# Patient Record
Sex: FEMALE | Race: WHITE | NOT HISPANIC OR LATINO | Employment: PART TIME | URBAN - METROPOLITAN AREA
[De-identification: names, ages, dates, MRNs, and addresses within clinical notes are randomized per-mention and may not be internally consistent; named-entity substitution may affect disease eponyms.]

---

## 2023-10-04 ENCOUNTER — OFFICE VISIT (OUTPATIENT)
Dept: URGENT CARE | Facility: URGENT CARE | Age: 23
End: 2023-10-04
Payer: COMMERCIAL

## 2023-10-04 ENCOUNTER — ANCILLARY PROCEDURE (OUTPATIENT)
Dept: GENERAL RADIOLOGY | Facility: CLINIC | Age: 23
End: 2023-10-04
Attending: NURSE PRACTITIONER
Payer: COMMERCIAL

## 2023-10-04 VITALS
RESPIRATION RATE: 16 BRPM | SYSTOLIC BLOOD PRESSURE: 108 MMHG | HEIGHT: 62 IN | BODY MASS INDEX: 42.33 KG/M2 | WEIGHT: 230 LBS | OXYGEN SATURATION: 97 % | HEART RATE: 88 BPM | TEMPERATURE: 98.4 F | DIASTOLIC BLOOD PRESSURE: 80 MMHG

## 2023-10-04 DIAGNOSIS — M79.671 RIGHT FOOT PAIN: ICD-10-CM

## 2023-10-04 DIAGNOSIS — S92.354A CLOSED NONDISPLACED FRACTURE OF FIFTH METATARSAL BONE OF RIGHT FOOT, INITIAL ENCOUNTER: Primary | ICD-10-CM

## 2023-10-04 PROCEDURE — 99204 OFFICE O/P NEW MOD 45 MIN: CPT | Performed by: NURSE PRACTITIONER

## 2023-10-04 PROCEDURE — 73630 X-RAY EXAM OF FOOT: CPT | Mod: TC | Performed by: RADIOLOGY

## 2023-10-04 RX ORDER — FLUOXETINE 20 MG/1
30 TABLET, FILM COATED ORAL DAILY
COMMUNITY

## 2023-10-04 NOTE — PROGRESS NOTES
"  ICD-10-CM    1. Closed nondisplaced fracture of fifth metatarsal bone of right foot, initial encounter  S92.354A Ankle/Foot Bracing [employer]lies DME Walking Boot; Right; Pneumatic; Short     Orthopedic  Referral      2. Right foot pain  M79.671 XR Foot Right G/E 3 Views      Patient placed in a walking boot. Ice, elevation, Ibuprofen or Tylenol as needed for pain. Follow up with orthopedics next week.    SUBJECTIVE:  Chief Complaint   Patient presents with    Urgent Care    Musculoskeletal Problem     Injury to right ankle. Woke up with foot asleep so she tripped on Friday     Lavelle Frias is a 22 year old female presents with a chief complaint of right foot and toe(s) fourth and fifth pain, swelling, and tenderness.  The injury occurred 4 day(s) ago.   The injury happened while while walking. How: near-fallimmediate pain, immediate swelling, inability to bear weight directly after injury.  The patient complained of moderate pain  and has had decreased ROM.  Pain exacerbated by walking, weight-bearing, and movement.  Relieved by rest and ice.  She treated it initially with ice and Ibuprofen. This is the first time this type of injury has occurred to this patient.     ROS:  Denies any numbness or tingling in the area.     EXAM:   /80   Pulse 88   Temp 98.4  F (36.9  C) (Temporal)   Resp 16   Ht 1.575 m (5' 2\")   Wt 104.3 kg (230 lb)   SpO2 97%   BMI 42.07 kg/m    Gen: alert, no distress, and healthy,alert,no distress  Extremity: foot and toe(s) fourth and fifth has swelling and point tenderness along the fifth metatarsal.   There is not compromise to the distal circulation.  Pulses are +2 and CRT is brisk  GENERAL APPEARANCE: healthy, alert and no distress  EXTREMITIES: peripheral pulses normal  MS: Moves all extremities normally except for the left foot which has some limited range of motion and tenderness over along the fifth metatarsal, ecchymosis noted over the third through fourth proximal " toes but no pain with palpation in this area  SKIN: no suspicious lesions or rashes  NEURO: Normal strength and tone, sensory exam grossly normal, mentation intact and speech normal      DOMINGUEZ Erwin, CNP  [facility]

## 2023-10-04 NOTE — PATIENT INSTRUCTIONS
Ibuprofen 600 mg every 6 hours as needed for pain.    Apply ice for 20 minutes 3-4 times a day.    Follow-up with orthopedics in the next 1 to 2 weeks.    If you develop severe unrelenting pain in the foot please go to the emergency room immediately.